# Patient Record
Sex: FEMALE | Race: WHITE | Employment: FULL TIME | URBAN - METROPOLITAN AREA
[De-identification: names, ages, dates, MRNs, and addresses within clinical notes are randomized per-mention and may not be internally consistent; named-entity substitution may affect disease eponyms.]

---

## 2019-04-05 ENCOUNTER — HOSPITAL ENCOUNTER (EMERGENCY)
Age: 59
Discharge: HOME | End: 2019-04-05
Attending: EMERGENCY MEDICINE
Payer: COMMERCIAL

## 2019-04-05 ENCOUNTER — APPOINTMENT (OUTPATIENT)
Dept: CT IMAGING | Age: 59
End: 2019-04-05
Payer: COMMERCIAL

## 2019-04-05 VITALS
WEIGHT: 148.19 LBS | DIASTOLIC BLOOD PRESSURE: 88 MMHG | SYSTOLIC BLOOD PRESSURE: 150 MMHG | OXYGEN SATURATION: 98 % | HEIGHT: 68 IN | HEART RATE: 72 BPM | TEMPERATURE: 98.1 F | BODY MASS INDEX: 22.46 KG/M2 | RESPIRATION RATE: 16 BRPM

## 2019-04-05 DIAGNOSIS — S09.90XA INJURY OF HEAD, INITIAL ENCOUNTER: ICD-10-CM

## 2019-04-05 DIAGNOSIS — S01.01XA LACERATION OF SCALP, INITIAL ENCOUNTER: Primary | ICD-10-CM

## 2019-04-05 DIAGNOSIS — R03.0 ELEVATED BLOOD PRESSURE READING: ICD-10-CM

## 2019-04-05 PROCEDURE — 99283 EMERGENCY DEPT VISIT LOW MDM: CPT

## 2019-04-05 PROCEDURE — 70450 CT HEAD/BRAIN W/O DYE: CPT

## 2019-04-05 RX ORDER — LIDOCAINE HYDROCHLORIDE AND EPINEPHRINE 10; 10 MG/ML; UG/ML
20 INJECTION, SOLUTION INFILTRATION; PERINEURAL ONCE
Status: DISCONTINUED | OUTPATIENT
Start: 2019-04-05 | End: 2019-04-06 | Stop reason: HOSPADM

## 2019-04-05 ASSESSMENT — PAIN DESCRIPTION - LOCATION: LOCATION: HEAD

## 2019-04-05 ASSESSMENT — PAIN SCALES - GENERAL: PAINLEVEL_OUTOF10: 2

## 2019-04-05 ASSESSMENT — PAIN DESCRIPTION - FREQUENCY: FREQUENCY: CONTINUOUS

## 2019-04-05 ASSESSMENT — PAIN DESCRIPTION - DESCRIPTORS: DESCRIPTORS: OTHER (COMMENT)

## 2019-04-05 ASSESSMENT — PAIN DESCRIPTION - PAIN TYPE: TYPE: ACUTE PAIN

## 2019-04-06 NOTE — ED PROVIDER NOTES
Emergency Physician Note    Chief Complaint  Fall (hit book shelf admits to drinking has laceration to right side of scalp no loc women present when fell) and Laceration       History of Present Illness  Rubén Smith is a 63 y.o. female who presents to the ED for head injury with a head laceration. Patient does admit to drinking alcohol tonight. Patient has friends at the bedside who witnessed the fall, she fell because she lost her balance due to her drinking. On the way down it is believed that her head hit the handle of a bookshelf. No loss of consciousness. Denies fever, chills, malaise, chest pain, shortness of breath, cough, abdominal pain, nausea, vomiting, diarrhea, headache, sore throat, dysuria, back pain, rash. No palliative/provocative factors. Positives and pertinent negatives as per HPI. All other of the 10 systems were reviewed and are negative. I have reviewed the following from the nursing documentation:      Prior to Admission medications    Medication Sig Start Date End Date Taking? Authorizing Provider   Travoprost, CARRIE Free, (TRAVATAN Z) 0.004 % SOLN ophthalmic solution Place 1 drop into both eyes nightly   Yes Historical Provider, MD   latanoprost (XALATAN) 0.005 % ophthalmic solution 1 drop nightly. Yes Historical Provider, MD   escitalopram (LEXAPRO) 5 MG tablet Take 5 mg by mouth daily.      Yes Historical Provider, MD   rosuvastatin (CRESTOR) 40 MG tablet Take 40 mg by mouth every evening    Yes Historical Provider, MD       Allergies as of 2019    (No Known Allergies)       Past Medical History:   Diagnosis Date    Anxiety     Depression     Glaucoma     Hyperlipidemia         Surgical History:   Past Surgical History:   Procedure Laterality Date    APPENDECTOMY      BREAST SURGERY       SECTION      COLONOSCOPY  15    normal        Family History:    Family History   Problem Relation Age of Onset    High Cholesterol Mother    Amy Amado REPAIR  Demond Stone or their surrogate had an opportunity to ask questions, and the risks, benefits, and alternatives were discussed. The wound was prepped and draped to maintain a sterile field. A local anesthetic was used to completely anesthetize the wound. It was copiously irrigated. It was explored to its depth in a bloodless field with no sign of tendon, nerve, or vascular injury. No foreign bodies were identified. It was closed with 6 staples. The laceration was measured at 4 cm. There were no complications during the procedure. MEDICAL DECISION MAKING    Procedures/interventions/images ordered for this visit  Orders Placed This Encounter   Procedures    CT Head WO Contrast       Medications ordered for this visit  Orders Placed This Encounter   Medications    lidocaine-EPINEPHrine 1 percent-[id number] injection 20 mL       This is a pleasant patient who unfortunately has sustained a head injury. Based on history this was not a syncopal episode. A thorough head to toe exam was performed. Vital signs are stable and they are neurovascular status intact. Based on the exam, there are no significant findings to suggest an intracranial injury such as subdural hematoma, subarachnoid hemorrhage, epidural hematoma, depressed skull fracture, basilar skull fracture. There is also no significant evidence of a facial bone fracture, neck injury or other injury requiring immediate surgical or medical intervention at this time, other than the wound repair. Counseling was provided on wound care, signs and symptoms of infection, closed head injury precautions and the need for brain rest, as well as the follow-up plan for suture/staple removal.  Please see procedure note for wound repair. We discussed that despite no foreign body seen or palpated on exam, this does not rule out the risk of retained foreign body and if there is a complication with healing, this needs to be considered.  Tetanus status and pain management were addressed. It is understood that if the patient is not improving as expected or if other new symptoms or signs of concern develop, other etiologies or diagnoses may need to be considered requiring other tests, treatments, consultations, and/or admission. The diagnosis, plan, expected course, follow-up, and return precautions were discussed and all questions were answered. The wound was not grossly contaminated therefore prophylactic antibiotics are not indicated at this time. Final Impression    1. Laceration of scalp, initial encounter    2. Injury of head, initial encounter    3. Elevated blood pressure reading        Blood pressure (!) 163/87, pulse 92, temperature 98.1 °F (36.7 °C), resp. rate 16, height 5' 8\" (1.727 m), weight 67.2 kg (148 lb 3 oz), SpO2 98 %, not currently breastfeeding. Patient and/or companions verbalized understanding of the ED workup, any relevant findings as well as any incidental findings, and the disposition and plan. Questions sought and answered with the patient and/or family members. They voice understanding and agree with plan. If the patient was discharged from the ED, they were instructed to return for any worsening or worrisome concerns. Patient was given scripts for the following medications. I counseled patient how to take these medications. New Prescriptions    No medications on file       Disposition  Pt is in stable condition upon Discharge to home. The note was completed using Dragon voice recognition transcription. Every effort was made to ensure accuracy; however, inadvertent transcription errors may be present despite my best efforts to edit errors.     Khai Cruz MD  [address] [place] [address]        Khai Cruz MD  04/05/19 0057